# Patient Record
Sex: FEMALE | Race: WHITE | NOT HISPANIC OR LATINO | Employment: FULL TIME | URBAN - METROPOLITAN AREA
[De-identification: names, ages, dates, MRNs, and addresses within clinical notes are randomized per-mention and may not be internally consistent; named-entity substitution may affect disease eponyms.]

---

## 2022-02-19 ENCOUNTER — HOSPITAL ENCOUNTER (EMERGENCY)
Facility: CLINIC | Age: 52
Discharge: HOME | End: 2022-02-19
Attending: PHYSICIAN ASSISTANT | Admitting: PHYSICIAN ASSISTANT
Payer: COMMERCIAL

## 2022-02-19 ENCOUNTER — APPOINTMENT (OUTPATIENT)
Dept: GENERAL RADIOLOGY | Facility: CLINIC | Age: 52
End: 2022-02-19
Attending: PHYSICIAN ASSISTANT
Payer: COMMERCIAL

## 2022-02-19 VITALS
SYSTOLIC BLOOD PRESSURE: 118 MMHG | RESPIRATION RATE: 16 BRPM | OXYGEN SATURATION: 97 % | DIASTOLIC BLOOD PRESSURE: 76 MMHG | WEIGHT: 155 LBS | TEMPERATURE: 96 F | HEART RATE: 104 BPM

## 2022-02-19 DIAGNOSIS — M25.562 ACUTE PAIN OF LEFT KNEE: ICD-10-CM

## 2022-02-19 PROCEDURE — G0463 HOSPITAL OUTPT CLINIC VISIT: HCPCS | Performed by: PHYSICIAN ASSISTANT

## 2022-02-19 PROCEDURE — 99203 OFFICE O/P NEW LOW 30 MIN: CPT | Performed by: PHYSICIAN ASSISTANT

## 2022-02-19 PROCEDURE — 73560 X-RAY EXAM OF KNEE 1 OR 2: CPT | Mod: LT

## 2022-02-19 RX ORDER — LIDOCAINE 4 G/G
1 PATCH TOPICAL EVERY 24 HOURS
Qty: 6 PATCH | Refills: 0 | Status: SHIPPED | OUTPATIENT
Start: 2022-02-19

## 2022-02-19 ASSESSMENT — ENCOUNTER SYMPTOMS
WOUND: 0
CONSTITUTIONAL NEGATIVE: 1
CHILLS: 0
WEAKNESS: 0
NUMBNESS: 0
FEVER: 0

## 2022-02-19 NOTE — ED PROVIDER NOTES
History     Chief Complaint   Patient presents with     Knee Pain     HPI  Lizette Gaxiola is a 51 year old female who presents today with left knee pain and medial swelling that started a few days ago. Patient states her back pain flared up a few days prior and she is not sure if the knee pain is from compensating, but back pain has since improved. She is currently at [facility] for alcohol treatment and states she does a lot of walking. Patient denies fevers, recent illness, redness to joint, decreased range of motion, or warmth to area. No numbness/tingling. Patient has tried ice, tylenol and ibuprofen over the counter with minimal improvement.      Allergies:  No Known Allergies    Problem List:    There are no problems to display for this patient.       Past Medical History:    No past medical history on file.    Past Surgical History:    No past surgical history on file.    Family History:    No family history on file.    Social History:  Marital Status:   [2]  Social History     Tobacco Use     Smoking status: Not on file     Smokeless tobacco: Not on file   Substance Use Topics     Alcohol use: Not on file     Drug use: Not on file        Medications:    diclofenac (VOLTAREN) 1 % topical gel  Lidocaine (LIDOCARE) 4 % Patch          Review of Systems   Constitutional: Negative.  Negative for chills and fever.   Musculoskeletal:        Left knee pain and swelling.    Skin: Negative.  Negative for rash and wound.   Neurological: Negative for weakness and numbness.   All other systems reviewed and are negative.      Physical Exam   BP: (!) 163/80  Pulse: 75  Temp: 97.2  F (36.2  C)  Resp: 16  Weight: 70.3 kg (155 lb)  SpO2: 98 %      Physical Exam  Vitals and nursing note reviewed.   Constitutional:       General: She is not in acute distress.     Appearance: Normal appearance. She is normal weight. She is not ill-appearing or toxic-appearing.   Cardiovascular:      Pulses: Normal pulses.    Musculoskeletal:      Left hip: Normal.      Left knee: Swelling present. No deformity, erythema, ecchymosis, lacerations or crepitus. Normal range of motion. Tenderness present over the medial joint line. No LCL laxity, MCL laxity, ACL laxity or PCL laxity.Normal alignment, normal meniscus and normal patellar mobility. Normal pulse.      Instability Tests: Anterior drawer test negative. Posterior drawer test negative. Anterior Lachman test negative. Medial Carlos test negative and lateral Carlos test negative.      Left lower leg: Normal.      Left ankle: Normal.      Left Achilles Tendon: Normal.      Left foot: Normal. Normal capillary refill. Normal pulse.        Legs:       Comments: Patient neurovascularly intact to bilateral lower legs.    Skin:     General: Skin is warm.      Capillary Refill: Capillary refill takes less than 2 seconds.      Findings: No bruising, erythema or rash.   Neurological:      General: No focal deficit present.      Mental Status: She is alert and oriented to person, place, and time.      Sensory: Sensory deficit present.      Motor: No weakness.      Gait: Gait abnormal (limited due to pain ).   Psychiatric:         Mood and Affect: Mood normal.         Behavior: Behavior normal.         Thought Content: Thought content normal.         Judgment: Judgment normal.         ED Course                 Procedures             Critical Care time:  none               Results for orders placed or performed during the hospital encounter of 02/19/22 (from the past 24 hour(s))   XR Knee Left 1/2 Views    Narrative    EXAM: XR KNEE LT 1/2 VW  LOCATION: [facility]  DATE/TIME: 2/19/2022 12:21 PM    INDICATION: Left knee pain and swelling, no known injury.  COMPARISON: None.      Impression    IMPRESSION: Left knee negative for fracture, joint effusion, or joint malalignment. Maintained joint spacing throughout the knee. Minimal osteophytic spurring in the medial  and patellofemoral compartments.       Medications - No data to display    Assessments & Plan (with Medical Decision Making)     I have reviewed the nursing notes.    I have reviewed the findings, diagnosis, plan and need for follow up with the patient.    Lizette Gaxiola is a 51 year old female who presents today with left knee pain and medial swelling that started a few days ago. Patient states her back pain flared up a few days prior and she is not sure if the knee pain is from compensating, but back pain has since improved. She is currently at [facility] for alcohol treatment and states she does a lot of walking. Patient denies fevers, recent illness, redness to joint, decreased range of motion, or warmth to area. No numbness/tingling. Patient has tried ice, tylenol and ibuprofen over the counter with minimal improvement.     See exam findings above.  Patient neurovascularly intact with full range of motion in the knee.  No concerning findings for cellulitis or joint infection.  No ligament laxity noted.  X-ray obtained and was negative for fracture, joint effusion or malalignment.  Minimal osteophytic spurring in the medial and patellofemoral compartment noted.  Patient requesting brace for the knee.  Patient placed in knee hinged brace and crutches to use as needed.  Patient to continue ice, rest, elevate.  Diclofenac topical gel to use during the day with the knee hinged brace.  Patient can take the brace off at night and use lidocaine patch as needed for pain.  Patient also given orthopedic referral for further evaluation management.  Patient informed of side effects of these medications and risks and agrees with plan.    Discharge Medication List as of 2/19/2022 12:56 PM      START taking these medications    Details   diclofenac (VOLTAREN) 1 % topical gel Apply 2 g topically 3 times daily as needed for moderate pain, Disp-50 g, R-0, E-Prescribe      Lidocaine (LIDOCARE) 4 % Patch Place 1 patch onto the skin  every 24 hours To prevent lidocaine toxicity, patient should be patch free for 12 hrs daily.Disp-6 patch, [id number]             Final diagnoses:   Acute pain of left knee       2/19/2022   [facility] EMERGENCY DEPT     Mary Austin PA-C  02/19/22 [id number]

## 2022-02-19 NOTE — DISCHARGE INSTRUCTIONS
Hinged knee as needed for pain (do not sleep with this on at night)    Topical diclofenac gel up to 3 times daily as needed for pain during the day.     Lidocaine patch as needed at night (only have patch on for 12 hours at a time in a 24 hour period)     Ice, elevate, rest    Follow up with orthopedic specialist if symptoms persist/fail to improve.

## 2022-05-29 ENCOUNTER — HEALTH MAINTENANCE LETTER (OUTPATIENT)
Age: 52
End: 2022-05-29

## 2022-10-03 ENCOUNTER — HEALTH MAINTENANCE LETTER (OUTPATIENT)
Age: 52
End: 2022-10-03

## 2023-06-04 ENCOUNTER — HEALTH MAINTENANCE LETTER (OUTPATIENT)
Age: 53
End: 2023-06-04

## 2024-07-28 ENCOUNTER — HEALTH MAINTENANCE LETTER (OUTPATIENT)
Age: 54
End: 2024-07-28